# Patient Record
Sex: MALE | Race: WHITE | Employment: FULL TIME | URBAN - METROPOLITAN AREA
[De-identification: names, ages, dates, MRNs, and addresses within clinical notes are randomized per-mention and may not be internally consistent; named-entity substitution may affect disease eponyms.]

---

## 2021-04-15 ENCOUNTER — HOSPITAL ENCOUNTER (EMERGENCY)
Age: 41
Discharge: HOME | End: 2021-04-15
Attending: STUDENT IN AN ORGANIZED HEALTH CARE EDUCATION/TRAINING PROGRAM
Payer: COMMERCIAL

## 2021-04-15 ENCOUNTER — APPOINTMENT (OUTPATIENT)
Dept: CT IMAGING | Age: 41
End: 2021-04-15
Payer: COMMERCIAL

## 2021-04-15 ENCOUNTER — APPOINTMENT (OUTPATIENT)
Dept: GENERAL RADIOLOGY | Age: 41
End: 2021-04-15
Payer: COMMERCIAL

## 2021-04-15 VITALS
OXYGEN SATURATION: 98 % | SYSTOLIC BLOOD PRESSURE: 146 MMHG | DIASTOLIC BLOOD PRESSURE: 98 MMHG | RESPIRATION RATE: 18 BRPM | TEMPERATURE: 98.1 F | HEART RATE: 96 BPM

## 2021-04-15 DIAGNOSIS — R07.89 RIGHT-SIDED CHEST WALL PAIN: Primary | ICD-10-CM

## 2021-04-15 DIAGNOSIS — R74.01 TRANSAMINITIS: ICD-10-CM

## 2021-04-15 LAB
A/G RATIO: 1.5 (ref 1.1–2.2)
ALBUMIN SERPL-MCNC: 4.5 G/DL (ref 3.4–5)
ALP BLD-CCNC: 100 U/L (ref 40–129)
ALT SERPL-CCNC: 42 U/L (ref 10–40)
ANION GAP SERPL CALCULATED.3IONS-SCNC: 10 MMOL/L (ref 3–16)
AST SERPL-CCNC: 44 U/L (ref 15–37)
BASOPHILS ABSOLUTE: 0.2 K/UL (ref 0–0.2)
BASOPHILS RELATIVE PERCENT: 1.2 %
BILIRUB SERPL-MCNC: <0.2 MG/DL (ref 0–1)
BUN BLDV-MCNC: 13 MG/DL (ref 7–20)
CALCIUM SERPL-MCNC: 9.7 MG/DL (ref 8.3–10.6)
CHLORIDE BLD-SCNC: 104 MMOL/L (ref 99–110)
CO2: 26 MMOL/L (ref 21–32)
CREAT SERPL-MCNC: 0.7 MG/DL (ref 0.9–1.3)
D DIMER: 298 NG/ML DDU (ref 0–229)
EKG ATRIAL RATE: 91 BPM
EKG DIAGNOSIS: NORMAL
EKG P AXIS: 63 DEGREES
EKG P-R INTERVAL: 162 MS
EKG Q-T INTERVAL: 360 MS
EKG QRS DURATION: 84 MS
EKG QTC CALCULATION (BAZETT): 442 MS
EKG R AXIS: 17 DEGREES
EKG T AXIS: 20 DEGREES
EKG VENTRICULAR RATE: 91 BPM
EOSINOPHILS ABSOLUTE: 0.2 K/UL (ref 0–0.6)
EOSINOPHILS RELATIVE PERCENT: 1.2 %
GFR AFRICAN AMERICAN: >60
GFR NON-AFRICAN AMERICAN: >60
GLOBULIN: 3 G/DL
GLUCOSE BLD-MCNC: 87 MG/DL (ref 70–99)
HCT VFR BLD CALC: 42.4 % (ref 40.5–52.5)
HEMOGLOBIN: 14.3 G/DL (ref 13.5–17.5)
INFLUENZA A: NOT DETECTED
INFLUENZA B: NOT DETECTED
LYMPHOCYTES ABSOLUTE: 4.1 K/UL (ref 1–5.1)
LYMPHOCYTES RELATIVE PERCENT: 28.3 %
MCH RBC QN AUTO: 28.8 PG (ref 26–34)
MCHC RBC AUTO-ENTMCNC: 33.8 G/DL (ref 31–36)
MCV RBC AUTO: 85.3 FL (ref 80–100)
MONOCYTES ABSOLUTE: 1 K/UL (ref 0–1.3)
MONOCYTES RELATIVE PERCENT: 7.1 %
NEUTROPHILS ABSOLUTE: 8.9 K/UL (ref 1.7–7.7)
NEUTROPHILS RELATIVE PERCENT: 62.2 %
PDW BLD-RTO: 14.5 % (ref 12.4–15.4)
PLATELET # BLD: 309 K/UL (ref 135–450)
PMV BLD AUTO: 7.6 FL (ref 5–10.5)
POTASSIUM REFLEX MAGNESIUM: 4.1 MMOL/L (ref 3.5–5.1)
RBC # BLD: 4.96 M/UL (ref 4.2–5.9)
SARS-COV-2 RNA, RT PCR: NOT DETECTED
SODIUM BLD-SCNC: 140 MMOL/L (ref 136–145)
TOTAL PROTEIN: 7.5 G/DL (ref 6.4–8.2)
TROPONIN: <0.01 NG/ML
WBC # BLD: 14.4 K/UL (ref 4–11)

## 2021-04-15 PROCEDURE — 85025 COMPLETE CBC W/AUTO DIFF WBC: CPT

## 2021-04-15 PROCEDURE — 84484 ASSAY OF TROPONIN QUANT: CPT

## 2021-04-15 PROCEDURE — 71260 CT THORAX DX C+: CPT

## 2021-04-15 PROCEDURE — 99285 EMERGENCY DEPT VISIT HI MDM: CPT

## 2021-04-15 PROCEDURE — 71101 X-RAY EXAM UNILAT RIBS/CHEST: CPT

## 2021-04-15 PROCEDURE — 80053 COMPREHEN METABOLIC PANEL: CPT

## 2021-04-15 PROCEDURE — 93010 ELECTROCARDIOGRAM REPORT: CPT | Performed by: INTERNAL MEDICINE

## 2021-04-15 PROCEDURE — 93005 ELECTROCARDIOGRAM TRACING: CPT | Performed by: STUDENT IN AN ORGANIZED HEALTH CARE EDUCATION/TRAINING PROGRAM

## 2021-04-15 PROCEDURE — 6370000000 HC RX 637 (ALT 250 FOR IP): Performed by: STUDENT IN AN ORGANIZED HEALTH CARE EDUCATION/TRAINING PROGRAM

## 2021-04-15 PROCEDURE — 87636 SARSCOV2 & INF A&B AMP PRB: CPT

## 2021-04-15 PROCEDURE — 85379 FIBRIN DEGRADATION QUANT: CPT

## 2021-04-15 PROCEDURE — 6360000004 HC RX CONTRAST MEDICATION: Performed by: STUDENT IN AN ORGANIZED HEALTH CARE EDUCATION/TRAINING PROGRAM

## 2021-04-15 RX ORDER — ACETAMINOPHEN 500 MG
1000 TABLET ORAL ONCE
Status: COMPLETED | OUTPATIENT
Start: 2021-04-15 | End: 2021-04-15

## 2021-04-15 RX ORDER — LIDOCAINE 50 MG/G
1 PATCH TOPICAL DAILY
Qty: 10 PATCH | Refills: 0 | Status: SHIPPED | OUTPATIENT
Start: 2021-04-15 | End: 2021-04-25

## 2021-04-15 RX ORDER — NAPROXEN 500 MG/1
500 TABLET ORAL 2 TIMES DAILY PRN
Qty: 20 TABLET | Refills: 0 | Status: SHIPPED | OUTPATIENT
Start: 2021-04-15 | End: 2021-05-04

## 2021-04-15 RX ORDER — LIDOCAINE 4 G/G
1 PATCH TOPICAL ONCE
Status: DISCONTINUED | OUTPATIENT
Start: 2021-04-15 | End: 2021-04-15 | Stop reason: HOSPADM

## 2021-04-15 RX ADMIN — IOPAMIDOL 85 ML: 755 INJECTION, SOLUTION INTRAVENOUS at 19:24

## 2021-04-15 RX ADMIN — ACETAMINOPHEN 1000 MG: 500 TABLET ORAL at 18:53

## 2021-04-15 ASSESSMENT — PAIN SCALES - GENERAL
PAINLEVEL_OUTOF10: 2
PAINLEVEL_OUTOF10: 2

## 2021-04-15 ASSESSMENT — PAIN DESCRIPTION - LOCATION: LOCATION: RIB CAGE

## 2021-04-15 ASSESSMENT — PAIN DESCRIPTION - ORIENTATION
ORIENTATION: RIGHT
ORIENTATION: RIGHT

## 2021-04-15 NOTE — ED PROVIDER NOTES
[address] ED      CHIEF COMPLAINT  Rib Pain (Patient states that he woke up yesterday with pain in his ribs on the right side and thought he pulled muscle. Today it is worse and wraps around to his back. Denies any injury. )       HISTORY OF PRESENT ILLNESS  Babak Narayan is a 47 y.o. male with no related past medical history, who presents to the ED complaining of chest pain. Woke up yesterday with mild pain in the right side of ribs. No known injury. Worsened throughout the day. Pain is in lateral right chest. Radiates around body to spine. Worse with cough and movement. Reports chronic smokers cough. Denies SOB. No palliative factors. Tried ibuprofen without pain. Patient denies previous blood clot or active malignancy, leg swelling, hemoptysis, recent travel or surgery/prolonged immobilization, or OCP or other hormone use. They report that their most recent cardiac evaluation was: denies    Patient does smoke. No other complaints, modifying factors or associated symptoms. I have reviewed the following from the nursing documentation. History reviewed. No pertinent past medical history. History reviewed. No pertinent surgical history. History reviewed. No pertinent family history.   Social History     Socioeconomic History    Marital status:      Spouse name: Not on file    Number of children: Not on file    Years of education: Not on file    Highest education level: Not on file   Occupational History    Not on file   Social Needs    Financial resource strain: Not on file    Food insecurity     Worry: Not on file     Inability: Not on file    Transportation needs     Medical: Not on file     Non-medical: Not on file   Tobacco Use    Smoking status: Current Every Day Smoker     Packs/day: 0.50    Smokeless tobacco: Never Used   Substance and Sexual Activity    Alcohol use: No    Drug use: No    Sexual activity: Not on file   Lifestyle    Physical activity     Days per week: Not on file     Minutes per session: Not on file    Stress: Not on file   Relationships    Social connections     Talks on phone: Not on file     Gets together: Not on file     Attends [employer] service: Not on file     Active member of club or organization: Not on file     Attends meetings of clubs or organizations: Not on file     Relationship status: Not on file    Intimate partner violence     Fear of current or ex partner: Not on file     Emotionally abused: Not on file     Physically abused: Not on file     Forced sexual activity: Not on file   Other Topics Concern    Not on file   Social History Narrative    Not on file     No current facility-administered medications for this encounter. Current Outpatient Medications   Medication Sig Dispense Refill    naproxen (NAPROSYN) 500 MG tablet Take 1 tablet by mouth 2 times daily as needed for Pain 20 tablet 0    lidocaine (LIDODERM) 5 % Place 1 patch onto the skin daily for 10 days 12 hours on, 12 hours off. 10 patch 0    prednisoLONE acetate (ECONOPRED PLUS) 1 % ophthalmic suspension Place 1 drop into the right eye 4 times daily. 10 mL 0    atropine (ATROPINE-CARE) 1 % ophthalmic solution Place 1 drop into the right eye 2 times daily. 10 mL 4     No Known Allergies    REVIEW OF SYSTEMS  10 systems reviewed, pertinent positives per HPI otherwise noted to be negative. PHYSICAL EXAM  BP (!) 147/89   Pulse 105   Temp 98.1 °F (36.7 °C) (Tympanic)   Resp 22   SpO2 96%    GENERAL APPEARANCE: Awake and alert. Cooperative. no distress. HENT: Normocephalic. Atraumatic. Mucous membranes are moist  NECK: Supple. Full range of motion of the neck without stiffness or pain. EYES: PERRL. EOM's grossly intact. HEART/CHEST: tachycardia RR. No murmurs. Chest wall is mildly tender to palpation in right lateral lower ribs. LUNGS: Respirations unlabored. CTAB. Good air exchange. Speaking comfortably in full sentences.    ABDOMEN: No tenderness. Soft. Non-distended. No masses. No organomegaly. No guarding or rebound. MUSCULOSKELETAL: No extremity edema. Compartments soft. No deformity. No tenderness in the extremities. All extremities neurovascularly intact. SKIN: Warm and dry. No acute rashes. NEUROLOGICAL: Alert and oriented. No gross facial drooping. Strength 5/5, sensation intact. PSYCHIATRIC: Normal mood and affect. LABS  I have reviewed all labs for this visit.    Results for orders placed or performed during the hospital encounter of 04/15/21   COVID-19 & Influenza Combo    Specimen: Nasopharyngeal Swab   Result Value Ref Range    SARS-CoV-2 RNA, RT PCR NOT DETECTED NOT DETECTED    INFLUENZA A NOT DETECTED NOT DETECTED    INFLUENZA B NOT DETECTED NOT DETECTED   CBC Auto Differential   Result Value Ref Range    WBC 14.4 (H) 4.0 - 11.0 K/uL    RBC 4.96 4.20 - 5.90 M/uL    Hemoglobin 14.3 13.5 - 17.5 g/dL    Hematocrit 42.4 40.5 - 52.5 %    MCV 85.3 80.0 - 100.0 fL    MCH 28.8 26.0 - 34.0 pg    MCHC 33.8 31.0 - 36.0 g/dL    RDW 14.5 12.4 - 15.4 %    Platelets 858 592 - 586 K/uL    MPV 7.6 5.0 - 10.5 fL    Neutrophils % 62.2 %    Lymphocytes % 28.3 %    Monocytes % 7.1 %    Eosinophils % 1.2 %    Basophils % 1.2 %    Neutrophils Absolute 8.9 (H) 1.7 - 7.7 K/uL    Lymphocytes Absolute 4.1 1.0 - 5.1 K/uL    Monocytes Absolute 1.0 0.0 - 1.3 K/uL    Eosinophils Absolute 0.2 0.0 - 0.6 K/uL    Basophils Absolute 0.2 0.0 - 0.2 K/uL   Comprehensive Metabolic Panel w/ Reflex to MG   Result Value Ref Range    Sodium 140 136 - 145 mmol/L    Potassium reflex Magnesium 4.1 3.5 - 5.1 mmol/L    Chloride 104 99 - 110 mmol/L    CO2 26 21 - 32 mmol/L    Anion Gap 10 3 - 16    Glucose 87 70 - 99 mg/dL    BUN 13 7 - 20 mg/dL    CREATININE 0.7 (L) 0.9 - 1.3 mg/dL    GFR Non-African American >60 >60    GFR African American >60 >60    Calcium 9.7 8.3 - 10.6 mg/dL    Total Protein 7.5 6.4 - 8.2 g/dL    Albumin 4.5 3.4 - 5.0 g/dL    Albumin/Globulin Ratio 1.5 1.1 - 2.2    Total Bilirubin <0.2 0.0 - 1.0 mg/dL    Alkaline Phosphatase 100 40 - 129 U/L    ALT 42 (H) 10 - 40 U/L    AST 44 (H) 15 - 37 U/L    Globulin 3.0 g/dL   Troponin   Result Value Ref Range    Troponin <0.01 <0.01 ng/mL   D-Dimer, Quantitative   Result Value Ref Range    D-Dimer, Quant 298 (H) 0 - 229 ng/mL DDU   EKG 12 Lead   Result Value Ref Range    Ventricular Rate 91 BPM    Atrial Rate 91 BPM    P-R Interval 162 ms    QRS Duration 84 ms    Q-T Interval 360 ms    QTc Calculation (Bazett) 442 ms    P Axis 63 degrees    R Axis 17 degrees    T Axis 20 degrees    Diagnosis       Normal sinus rhythmNormal ECGWhen compared with ECG of 01-JUN-2010 11:58,No significant change was foundConfirmed by PARIS JACOBS MD ([id number]) on 4/15/2021 6:50:14 PM       ECG  The Ekg interpreted by me shows  normal sinus rhythm with a rate of 91  Axis is   Normal  QTc is  within an acceptable range  Intervals and Durations are unremarkable. ST Segments: nonspecific changes  No previous for comparison    RADIOLOGY    CT CHEST PULMONARY EMBOLISM W CONTRAST   Final Result   No evidence of a pulmonary embolus. Unremarkable thoracic aorta. Old healed granulomatous disease in the mediastinum and subcarinal region. No acute pulmonary abnormality. Probable cholelithiasis. Chronic liver changes         XR RIBS RIGHT INCLUDE CHEST (MIN 3 VIEWS)   Final Result   No acute cardiopulmonary disease. No rib fractures are identified. ED COURSE / MDM  Patient seen and evaluated. Old records reviewed. Labs and imaging reviewed and results discussed with patient. Overall well appearing patient, in no acute distress, presenting for right sided rib pain. No trauma. Physical exam remarkable for right lateral chest wall tenderness to palpation .      Differential diagnosis includes but is not limited to: Pneumonia, pneumothorax, pleural effusion, ACS, CHF exacerbation, COPD exacerbation, asthma, pulmonary embolism, arrhythmia, anemia    EKG, laboratory studies, and imaging obtained. During the patient's ED course, the patient was given:  Medications   acetaminophen (TYLENOL) tablet 1,000 mg (1,000 mg Oral Given 4/15/21 1853)   iopamidol (ISOVUE-370) 76 % injection 85 mL (85 mLs Intravenous Given 4/15/21 1924)        ED Course as of Apr 17 1353   Thu Apr 15, 2021   1739 CXR with Rib series: IMPRESSION:  No acute cardiopulmonary disease.     No rib fractures are identified.    [ER]   1931 D-dimer is elevated, will obtain CT PE.    [ER]   1931 No electrolyte abnormalities or evidence of kidney dysfunction.    [ER]   1932 Mild transaminitis, no right upper quadrant tenderness to palpation, and is definitely over the mid rib wall. Arredondo sign negative. Lower suspicion for hepatitis or acute liver pathology. [ER]   1932 Leukocytosis to 14.4, no anemia or thrombocytopenia. [ER]   2010 CT PE: IMPRESSION:  No evidence of a pulmonary embolus.     Unremarkable thoracic aorta.     Old healed granulomatous disease in the mediastinum and subcarinal region.     No acute pulmonary abnormality.     Probable cholelithiasis.     Chronic liver changes    [ER]   2044 Covid and flu negative. [ER]      ED Course User Index  [ER] Shamika Barreto MD      EKG showed no evidence of acute ischemia. Troponin was within normal limits. At this time, I have low suspicion for ACS based off location of pain being in the right lateral chest and tenderness to palpation. Low Suspicion for CHF exacerbation based off history and physical exam.     Chest x-ray showed no evidence of rib fracture, pneumonia, pleural effusion, pulmonary edema, or pneumothorax. COVID swab negative. Patient has not had a previous blood clot. Patient denies other risk factors for pulmonary embolism. Patient does not have any evidence of DVT on exam. However, patient tachycardic and does report pain in pleuritic.  D dimer obtained and was

## 2021-05-04 RX ORDER — IBUPROFEN 400 MG/1
400 TABLET ORAL EVERY 6 HOURS PRN
COMMUNITY
End: 2021-05-25 | Stop reason: ALTCHOICE

## 2021-05-04 ASSESSMENT — PATIENT HEALTH QUESTIONNAIRE - PHQ9
1. LITTLE INTEREST OR PLEASURE IN DOING THINGS: 0
SUM OF ALL RESPONSES TO PHQ QUESTIONS 1-9: 0
2. FEELING DOWN, DEPRESSED OR HOPELESS: 0
SUM OF ALL RESPONSES TO PHQ9 QUESTIONS 1 & 2: 0

## 2021-05-05 ENCOUNTER — VIRTUAL VISIT (OUTPATIENT)
Dept: FAMILY MEDICINE CLINIC | Age: 41
End: 2021-05-05
Payer: COMMERCIAL

## 2021-05-05 DIAGNOSIS — R79.89 ELEVATED LFTS: Primary | ICD-10-CM

## 2021-05-05 DIAGNOSIS — R03.0 ELEVATED BLOOD PRESSURE READING WITHOUT DIAGNOSIS OF HYPERTENSION: ICD-10-CM

## 2021-05-05 PROCEDURE — 99203 OFFICE O/P NEW LOW 30 MIN: CPT | Performed by: FAMILY MEDICINE

## 2021-05-05 RX ORDER — BLOOD PRESSURE TEST KIT
1 KIT MISCELLANEOUS 2 TIMES DAILY
Qty: 1 KIT | Refills: 0 | Status: SHIPPED | OUTPATIENT
Start: 2021-05-05 | End: 2021-05-25

## 2021-05-05 ASSESSMENT — ENCOUNTER SYMPTOMS: ABDOMINAL PAIN: 1

## 2021-05-05 NOTE — PROGRESS NOTES
2021    TELEHEALTH EVALUATION -- Audio/Visual (During [facility]- public health emergency)    HPI:    Davis Monday (:  1980) has requested an audio/video evaluation for the following concern(s):    Pt presents today via doxy. me Video visit to establish care. [facility]. LAbs wnl except for elevated WBC/neutrophils, elevated LFT's, elevated D-dimer, CT neg for PE, positive for probable cholelithiasis, and chronic liver changes. States today that still has the right sided abdominal pain, has improved, and is periodic, denies hx EtoH, IV drug drug use, or hepatitis. Doesn't have cuff at home. 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 Pain is at the bottom of the right 10th rib. Review of Systems   Gastrointestinal: Positive for abdominal pain. Prior to Visit Medications    Medication Sig Taking? 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 No pertinent surgical history.     PHYSICAL EXAMINATION:  [ INSTRUCTIONS:  \"[x]\" Indicates a positive item  \"[]\" Indicates a negative item  -- DELETE ALL ITEMS NOT EXAMINED]  Vital Signs: (As obtained by patient/caregiver or practitioner observation)    Height - 5' 11\"  Weight - 310 lb    Constitutional: [x] Appears well-developed and well-nourished [] No apparent distress [] Abnormal-   Mental status  [x] Alert and awake  [x] Oriented to person/place/time []Able to follow commands      Eyes:  EOM    [x]  Normal  [] Abnormal-  Sclera  []  Normal  [] Abnormal -         Discharge []  None visible  [] Abnormal -    HENT:   [x] Normocephalic, atraumatic. [] Abnormal   [] Mouth/Throat: Mucous membranes are moist.     External Ears [x] Normal  [] Abnormal-     Neck: [x] No visualized mass     Pulmonary/Chest: [x] Respiratory effort normal.  [x] No visualized signs of difficulty breathing or respiratory distress        [] Abnormal-      Musculoskeletal:   [] Normal gait with no signs of ataxia         [x] Normal range of motion of neck        [] Abnormal-       Neurological:        [x] No Facial Asymmetry (Cranial nerve 7 motor function) (limited exam to video visit)          [x] No gaze palsy        [] Abnormal-         Skin:        [x] No significant exanthematous lesions or discoloration noted on facial skin         [] Abnormal-            Psychiatric:       [x] Normal Affect [] No Hallucinations        [] Abnormal-     Other pertinent observable physical exam findings-     ASSESSMENT/PLAN:  1. Elevated LFTs  - AFL - Lauryn Zimmerman MD, Gastroenterology, [place]  - TSH without Reflex; Future  - Lipid Panel; Future    2. Elevated blood pressure reading without diagnosis of hypertension  - CBC Auto Differential; Future  - BP BID x 2 weeks and drop off log at office or use [employer]. Return in about 1 month (around 6/5/2021) for GI/Lab Results F/U. Marnie Doty, was evaluated through a synchronous (real-time) audio-video encounter. The patient (or guardian if applicable) is aware that this is a billable service. Verbal consent to proceed has been obtained within the past 12 months.  The visit was conducted pursuant to the emergency declaration under the [address] [facility], [address]5 waiver authority and the [employer] and [employer] Supplemental Appropriations Act. Patient identification was verified, and a caregiver was present when appropriate. The patient was located in a state where the provider was credentialed to provide care. Total time spent on this encounter: Not billed by time    --Jazzmine Sullivan DO on 5/5/2021 at 9:27 AM    An electronic signature was used to authenticate this note.

## 2021-05-24 DIAGNOSIS — R03.0 ELEVATED BLOOD PRESSURE READING WITHOUT DIAGNOSIS OF HYPERTENSION: ICD-10-CM

## 2021-05-24 DIAGNOSIS — R79.89 ELEVATED LFTS: ICD-10-CM

## 2021-05-24 LAB
BASOPHILS ABSOLUTE: 0.1 K/UL (ref 0–0.2)
BASOPHILS RELATIVE PERCENT: 0.7 %
CHOLESTEROL, TOTAL: 203 MG/DL (ref 0–199)
EOSINOPHILS ABSOLUTE: 0.3 K/UL (ref 0–0.6)
EOSINOPHILS RELATIVE PERCENT: 2.6 %
HCT VFR BLD CALC: 43.5 % (ref 40.5–52.5)
HDLC SERPL-MCNC: 30 MG/DL (ref 40–60)
HEMOGLOBIN: 14.5 G/DL (ref 13.5–17.5)
LDL CHOLESTEROL CALCULATED: 142 MG/DL
LYMPHOCYTES ABSOLUTE: 3.3 K/UL (ref 1–5.1)
LYMPHOCYTES RELATIVE PERCENT: 29.6 %
MCH RBC QN AUTO: 28.5 PG (ref 26–34)
MCHC RBC AUTO-ENTMCNC: 33.5 G/DL (ref 31–36)
MCV RBC AUTO: 85 FL (ref 80–100)
MONOCYTES ABSOLUTE: 0.7 K/UL (ref 0–1.3)
MONOCYTES RELATIVE PERCENT: 6.6 %
NEUTROPHILS ABSOLUTE: 6.8 K/UL (ref 1.7–7.7)
NEUTROPHILS RELATIVE PERCENT: 60.5 %
PDW BLD-RTO: 15.1 % (ref 12.4–15.4)
PLATELET # BLD: 295 K/UL (ref 135–450)
PMV BLD AUTO: 7.9 FL (ref 5–10.5)
RBC # BLD: 5.11 M/UL (ref 4.2–5.9)
TRIGL SERPL-MCNC: 154 MG/DL (ref 0–150)
TSH SERPL DL<=0.05 MIU/L-ACNC: 2.94 UIU/ML (ref 0.27–4.2)
VLDLC SERPL CALC-MCNC: 31 MG/DL
WBC # BLD: 11.3 K/UL (ref 4–11)

## 2021-05-25 ASSESSMENT — PATIENT HEALTH QUESTIONNAIRE - PHQ9
SUM OF ALL RESPONSES TO PHQ QUESTIONS 1-9: 0
SUM OF ALL RESPONSES TO PHQ9 QUESTIONS 1 & 2: 0
SUM OF ALL RESPONSES TO PHQ QUESTIONS 1-9: 0

## 2021-05-26 ENCOUNTER — VIRTUAL VISIT (OUTPATIENT)
Dept: FAMILY MEDICINE CLINIC | Age: 41
End: 2021-05-26
Payer: COMMERCIAL

## 2021-05-26 DIAGNOSIS — Z83.79 FAMILY HISTORY OF HEPATITIS: ICD-10-CM

## 2021-05-26 DIAGNOSIS — L98.9 SKIN ABNORMALITY: ICD-10-CM

## 2021-05-26 DIAGNOSIS — E78.00 ELEVATED LDL CHOLESTEROL LEVEL: ICD-10-CM

## 2021-05-26 DIAGNOSIS — R79.89 ELEVATED LFTS: Primary | ICD-10-CM

## 2021-05-26 DIAGNOSIS — R03.0 ELEVATED BLOOD PRESSURE READING WITHOUT DIAGNOSIS OF HYPERTENSION: ICD-10-CM

## 2021-05-26 DIAGNOSIS — D72.829 LEUKOCYTOSIS, UNSPECIFIED TYPE: ICD-10-CM

## 2021-05-26 PROCEDURE — 99214 OFFICE O/P EST MOD 30 MIN: CPT | Performed by: FAMILY MEDICINE

## 2021-05-26 RX ORDER — TRIAMCINOLONE ACETONIDE 1 MG/ML
LOTION TOPICAL
Qty: 1 BOTTLE | Refills: 0 | Status: SHIPPED | OUTPATIENT
Start: 2021-05-26 | End: 2021-07-13 | Stop reason: ALTCHOICE

## 2021-05-26 ASSESSMENT — ENCOUNTER SYMPTOMS
COLOR CHANGE: 1
ABDOMINAL PAIN: 1

## 2021-05-26 NOTE — PROGRESS NOTES
2021    TELEHEALTH EVALUATION -- Audio/Visual (During - public health emergency)    HPI:    Philip Knapp (:  1980) has requested an audio/video evaluation for the following concern(s):    Pt presents today via doxy. me Video visit for a 2 week follow-up for abdominal pain/elevated LFT's, elevated BP readings, and lab results. Abdominal pain/Elevated LFT's: Referred to GI, , Ashlee Higgins, on 21, has appointment on 21, still having the abdominal pain, not as intense, pain has moved periodically downward in the stomach area, has changed his diet which has helped. Work has been busier physically as well. Elevated BP: currently taking no medication, was instructed on 21 to check BP BID x 2 weeks and drop off results at the office which he hasn't done. Forgot to turn in log, readings avg 108/80. Lab Results: CBC/TSH/Lipid Panel wnl except for mildly elevated WBC's, mildly elevated total cholesterol and TG's, low HDL, and elevated LDL. Admits to a 3 week hx of bumps on his arms, mother has liver failure and hepatitis. Itchy and slightly raised, non-erythremic. Review of Systems   Constitutional: Negative for appetite change and fatigue. Gastrointestinal: Positive for abdominal pain. Skin: Positive for color change and rash. Neurological: Negative for dizziness and headaches. Prior to Visit Medications    Medication Sig Taking? Authorizing Provider   triamcinolone (KENALOG) 0.1 % lotion Apply topically 3 times daily.  Yes Marii Carrillo DO       Social History     Tobacco Use    Smoking status: Current Every Day Smoker     Packs/day: 0.50     Years: 20.00     Pack years: 10.00     Types: Cigarettes    Smokeless tobacco: Never Used   Vaping Use    Vaping Use: Never used   Substance Use Topics    Alcohol use: No    Drug use: No        No Known Allergies    PHYSICAL EXAMINATION:  [ INSTRUCTIONS:  \"[x]\" Indicates a positive item  \"[]\" Indicates a negative item  -- DELETE ALL ITEMS NOT EXAMINED]  Vital Signs: (As obtained by patient/caregiver or practitioner observation)    Height - 5' 11\"  Weight 310 lb     Constitutional: [x] Appears well-developed and well-nourished [x] No apparent distress      [] Abnormal-   Mental status  [x] Alert and awake  [x] Oriented to person/place/time [x]Able to follow commands      Eyes:  EOM    [x]  Normal  [] Abnormal-  Sclera  []  Normal  [] Abnormal -         Discharge []  None visible  [] Abnormal -    HENT:   [x] Normocephalic, atraumatic. [] Abnormal   [] Mouth/Throat: Mucous membranes are moist.     External Ears [x] Normal  [] Abnormal-     Neck: [x] No visualized mass     Pulmonary/Chest: [x] Respiratory effort normal.  [x] No visualized signs of difficulty breathing or respiratory distress        [] Abnormal-      Musculoskeletal:   [] Normal gait with no signs of ataxia         [x] Normal range of motion of neck        [] Abnormal-       Neurological:        [x] No Facial Asymmetry (Cranial nerve 7 motor function) (limited exam to video visit)          [x] No gaze palsy        [] Abnormal-         Skin:        [x] No significant exanthematous lesions or discoloration noted on facial skin         [x] Abnormal- Unable to visualize rash d/t tattoos         Psychiatric:       [x] Normal Affect [] No Hallucinations        [] Abnormal-     Other pertinent observable physical exam findings-     ASSESSMENT/PLAN:  1. Elevated LFTs  - has upcoming appointment with GI, Dr. Lito Yoon. 2. Elevated blood pressure reading without diagnosis of hypertension  - readings at home good    3. Elevated LDL cholesterol level  - recheck Lipid Panel; Future (2 months)    4. Family history of hepatitis  - Hepatitis Panel, Acute; Future    5. Skin abnormality  - Hepatitis Panel, Acute; Future  - triamcinolone (KENALOG) 0.1 % lotion; Apply topically 3 times daily. Dispense: 1 Bottle; Refill: 0    6.  Leukocytosis, unspecified type  - recheck CBC Auto Differential; Future (2 months)      Return in about 2 months (around 7/26/2021) for Physical Exam.    Yareli Aranda, was evaluated through a synchronous (real-time) audio-video encounter. The patient (or guardian if applicable) is aware that this is a billable service. Verbal consent to proceed has been obtained within the past 12 months. The visit was conducted pursuant to the emergency declaration under the [address] [place] [address] and the [employer] Act. Patient identification was verified, and a caregiver was present when appropriate. The patient was located in a state where the provider was credentialed to provide care. Total time spent on this encounter: Not billed by time    --Jazzmine Sullivan DO on 5/26/2021 at 10:21 AM    An electronic signature was used to authenticate this note.

## 2021-07-13 NOTE — PROGRESS NOTES
ENDOSCOPY PREOP PHONE INTERVIEW  INSTRUCTIONS:  All patients having anesthesia or sedation are required to be Covid tested OR to have been vaccinated at least 14 days prior to your procedure. It is very important to return our call to [phone] and notify the staff of your last vaccination date otherwise you will be required to complete Covid PCR test within the 5-6 days prior to surgery & quarantine. The results will need to be faxed to PreAdmission Testing at [phone]. Please follow all instructions / preps given to you from your doctor's office. If you have not received these instructions yet, please call the office immediately. Make sure to read them once received. Day of the procedure:    Enter the MAIN entrance located on [address] and report to the desk on left side of the lobby. Arrival Time: 1445 for your procedure scheduled at: [address] [facility]. No lotion, powders or jewelry   Bring your insurance & photo ID with you.  Bring an accurate list of your medications with doses/ frequency with you day of the procedure, including over the counter medications. If you are taking blood thinners, ASA or diabetic medication, make sure to call Dr. Belen Nicholas  or your PCP for instructions prior to your procedure.  Arrange for someone to be with you and sign you out & drive you home after your procedure.     We are allowing 1 visitor with you in the hospital.      If you have further questions, you may contact your Endoscopist's office or Pre Admission Testing staff at [address], RN.7/13/2021 .2:23 PM

## 2021-07-14 ENCOUNTER — ANESTHESIA EVENT (OUTPATIENT)
Dept: ENDOSCOPY | Age: 41
End: 2021-07-14
Payer: COMMERCIAL

## 2021-07-15 ENCOUNTER — HOSPITAL ENCOUNTER (OUTPATIENT)
Age: 41
Setting detail: OUTPATIENT SURGERY
Discharge: HOME | End: 2021-07-15
Attending: INTERNAL MEDICINE | Admitting: INTERNAL MEDICINE
Payer: COMMERCIAL

## 2021-07-15 ENCOUNTER — ANESTHESIA (OUTPATIENT)
Dept: ENDOSCOPY | Age: 41
End: 2021-07-15
Payer: COMMERCIAL

## 2021-07-15 VITALS
BODY MASS INDEX: 44.1 KG/M2 | HEIGHT: 71 IN | RESPIRATION RATE: 16 BRPM | TEMPERATURE: 97.7 F | HEART RATE: 91 BPM | OXYGEN SATURATION: 97 % | SYSTOLIC BLOOD PRESSURE: 124 MMHG | DIASTOLIC BLOOD PRESSURE: 77 MMHG | WEIGHT: 315 LBS

## 2021-07-15 LAB — SARS-COV-2, NAAT: NOT DETECTED

## 2021-07-15 PROCEDURE — 3609017100 HC EGD: Performed by: INTERNAL MEDICINE

## 2021-07-15 PROCEDURE — 87635 SARS-COV-2 COVID-19 AMP PRB: CPT

## 2021-07-15 RX ORDER — SODIUM CHLORIDE, SODIUM LACTATE, POTASSIUM CHLORIDE, CALCIUM CHLORIDE 600; 310; 30; 20 MG/100ML; MG/100ML; MG/100ML; MG/100ML
INJECTION, SOLUTION INTRAVENOUS CONTINUOUS
Status: DISCONTINUED | OUTPATIENT
Start: 2021-07-15 | End: 2021-07-15 | Stop reason: HOSPADM

## 2021-07-15 RX ORDER — SODIUM CHLORIDE 0.9 % (FLUSH) 0.9 %
10 SYRINGE (ML) INJECTION EVERY 12 HOURS SCHEDULED
Status: DISCONTINUED | OUTPATIENT
Start: 2021-07-15 | End: 2021-07-15 | Stop reason: HOSPADM

## 2021-07-15 RX ORDER — SODIUM CHLORIDE 0.9 % (FLUSH) 0.9 %
10 SYRINGE (ML) INJECTION PRN
Status: DISCONTINUED | OUTPATIENT
Start: 2021-07-15 | End: 2021-07-15 | Stop reason: HOSPADM

## 2021-07-15 RX ORDER — SODIUM CHLORIDE 9 MG/ML
25 INJECTION, SOLUTION INTRAVENOUS PRN
Status: DISCONTINUED | OUTPATIENT
Start: 2021-07-15 | End: 2021-07-15 | Stop reason: HOSPADM

## 2021-07-15 ASSESSMENT — PAIN - FUNCTIONAL ASSESSMENT: PAIN_FUNCTIONAL_ASSESSMENT: 0-10

## 2021-07-15 ASSESSMENT — LIFESTYLE VARIABLES: SMOKING_STATUS: 0

## 2021-07-15 NOTE — PROGRESS NOTES
Pt stuck 5 times for attempt to access IV site (this Gabriela East RN and Dr. Lety Mcintosh from anesthesia). Discussed with pt and wife. Pt states that he would prefer to cancel procedure for today and reschedule for another time. Dr. Rubi Roch aware. Pt to call office to reschedule.

## 2021-07-15 NOTE — PROGRESS NOTES
When verifying procedure with pt on admit, he states that he is scheduled for an endoscopy procedure today where his stomach is going to be looked at for RUQ abdominal pain. On current schedule, pt is scheduled for a Colonoscopy. He states that he did take a 2 dose prep over night and has been having liquid bowel movement. Paperwork from office reflects pt being scheduled for a Colonoscopy with  Plenvu prep. Problem list on office paperwork states Right upper quadrant pain; EGD to rule out PUD. This writer informed Dr. Ana Vega of conflicting information. Dr. Ana Vega telephoned office to confirm EGD procedure. After confirming with office, Rm Ballesteros discussed plan of care for today which is an EGD procedure and not a colonoscopy. Pt extremely pleasant and understanding. Agreeing to have EGD procedure. Consent obtained.

## 2021-07-15 NOTE — ANESTHESIA PRE PROCEDURE
Department of Anesthesiology  Preprocedure Note       Name:  Sheron Ramirez   Age:  47 y.o.  :  1980                                          MRN:  [id number]         Date:  7/15/2021      Surgeon: Rhonda Cherry):  Raoul Jacobs MD    Procedure: Procedure(s):  EGD DIAGNOSTIC ONLY    Medications prior to admission:   Prior to Admission medications    Not on File       Current medications:    Current Facility-Administered Medications   Medication Dose Route Frequency Provider Last Rate Last Admin    lactated ringers infusion   Intravenous Continuous Raynell Honour, DO        sodium chloride flush 0.9 % injection 10 mL  10 mL Intravenous 2 times per day Raynell Honour, DO        sodium chloride flush 0.9 % injection 10 mL  10 mL Intravenous PRN Raynell Honour, DO        0.9 % sodium chloride infusion  25 mL Intravenous PRN Raynell Honour, DO           Allergies:  No Known Allergies    Problem List:    Patient Active Problem List   Diagnosis Code    Elevated LFTs R79.89    Elevated blood pressure reading without diagnosis of hypertension R03.0    Elevated LDL cholesterol level E78.00       Past Medical History:        Diagnosis Date    Abnormal LFTs     Hearing loss     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 Social History:    Social History     Tobacco Use    Smoking status: Current Every Day Smoker     Packs/day: 0.50     Years: 20.00     Pack years: 10.00     Types: Cigarettes    Smokeless tobacco: Never Used   Substance Use Topics    Alcohol use:  No                                Ready to quit: Not Answered  Counseling given: Not Answered      Vital Signs (Current):   Vitals:    21 1421 07/15/21 1044   BP:  124/77   Pulse:  91   Resp:  16   Temp:  97.7 °F (36.5 °C)   TempSrc:  Temporal   SpO2:  97%   Weight: (!) 340 lb (154.2 kg) (!) 340 lb (154.2 kg)   Height: 5' 11\" (1.803 m) 5' 11\" (1.803 m) BP Readings from Last 3 Encounters:   07/15/21 124/77   04/15/21 (!) 146/98   05/16/18 (!) 155/98       NPO Status: Time of last liquid consumption: 2300                        Time of last solid consumption: 2300                                                      BMI:   Wt Readings from Last 3 Encounters:   07/15/21 (!) 340 lb (154.2 kg)   05/16/18 (!) 310 lb (140.6 kg)   09/23/16 275 lb (124.7 kg)     Body mass index is 47.42 kg/m². CBC:   Lab Results   Component Value Date    WBC 11.3 05/24/2021    RBC 5.11 05/24/2021    HGB 14.5 05/24/2021    HCT 43.5 05/24/2021    MCV 85.0 05/24/2021    RDW 15.1 05/24/2021     05/24/2021       CMP:   Lab Results   Component Value Date     04/15/2021    K 4.1 04/15/2021     04/15/2021    CO2 26 04/15/2021    BUN 13 04/15/2021    CREATININE 0.7 04/15/2021    GFRAA >60 04/15/2021    AGRATIO 1.5 04/15/2021    LABGLOM >60 04/15/2021    GLUCOSE 87 04/15/2021    PROT 7.5 04/15/2021    CALCIUM 9.7 04/15/2021    BILITOT <0.2 04/15/2021    ALKPHOS 100 04/15/2021    AST 44 04/15/2021    ALT 42 04/15/2021       POC Tests: No results for input(s): POCGLU, POCNA, POCK, POCCL, POCBUN, POCHEMO, POCHCT in the last 72 hours.     Coags: No results found for: PROTIME, INR, APTT    HCG (If Applicable): No results found for: PREGTESTUR, PREGSERUM, HCG, HCGQUANT     ABGs: No results found for: PHART, PO2ART, AAZ4PTM, LAD1KSB, BEART, S4AKOTPU     Type & Screen (If Applicable):  No results found for: LABABO, LABRH    Drug/Infectious Status (If Applicable):  No results found for: HIV, HEPCAB    COVID-19 Screening (If Applicable):   Lab Results   Component Value Date    COVID19 NOT DETECTED 04/15/2021           Anesthesia Evaluation  Patient summary reviewed and Nursing notes reviewed no history of anesthetic complications:   Airway: Mallampati: II  TM distance: >3 FB   Neck ROM: full  Mouth opening: > = 3 FB Dental:    (+) upper dentures and lower dentures      Pulmonary: breath sounds clear to auscultation  (+) sleep apnea (probable ):      (-) not a current smoker (1/2 ppd  x 15 yrs )          Patient smoked on day of surgery. Cardiovascular:  Exercise tolerance: good (>4 METS),       (-) past MI    NYHA Classification: II    Rhythm: regular  Rate: normal           Beta Blocker:  Not on Beta Blocker         Neuro/Psych:               GI/Hepatic/Renal:   (+) bowel prep, morbid obesity (BMI  47 BMI )     (-) GERD      ROS comment: Hx of hematemesis   R.o ulcer for egd      Had a bowel prep  Thought he was scheduled for colonoscopy . Endo/Other: Negative Endo/Other ROS                    Abdominal:   (+) obese,           Vascular: Other Findings:             Anesthesia Plan      MAC     ASA 3       Induction: intravenous. MIPS: Prophylactic antiemetics administered. Anesthetic plan and risks discussed with patient. Plan discussed with CRNA.     Attending anesthesiologist reviewed and agrees with Preprocedure content              Janet Whitt DO   7/15/2021